# Patient Record
Sex: MALE | Race: WHITE
[De-identification: names, ages, dates, MRNs, and addresses within clinical notes are randomized per-mention and may not be internally consistent; named-entity substitution may affect disease eponyms.]

---

## 2019-06-28 ENCOUNTER — HOSPITAL ENCOUNTER (INPATIENT)
Dept: HOSPITAL 1 - ED | Age: 64
LOS: 5 days | Discharge: NURSING, REHAB OR LONG-TERM CARE | DRG: 291 | End: 2019-07-03
Attending: INTERNAL MEDICINE | Admitting: INTERNAL MEDICINE
Payer: COMMERCIAL

## 2019-06-28 VITALS
BODY MASS INDEX: 36.64 KG/M2 | WEIGHT: 247.37 LBS | HEIGHT: 69 IN | WEIGHT: 247.37 LBS | HEIGHT: 69 IN | BODY MASS INDEX: 36.64 KG/M2

## 2019-06-28 VITALS — DIASTOLIC BLOOD PRESSURE: 66 MMHG | SYSTOLIC BLOOD PRESSURE: 128 MMHG

## 2019-06-28 DIAGNOSIS — Z66: ICD-10-CM

## 2019-06-28 DIAGNOSIS — Z95.5: ICD-10-CM

## 2019-06-28 DIAGNOSIS — E11.9: ICD-10-CM

## 2019-06-28 DIAGNOSIS — G89.29: ICD-10-CM

## 2019-06-28 DIAGNOSIS — Z79.01: ICD-10-CM

## 2019-06-28 DIAGNOSIS — I11.0: Primary | ICD-10-CM

## 2019-06-28 DIAGNOSIS — I25.10: ICD-10-CM

## 2019-06-28 DIAGNOSIS — F17.210: ICD-10-CM

## 2019-06-28 DIAGNOSIS — I25.2: ICD-10-CM

## 2019-06-28 DIAGNOSIS — E11.65: ICD-10-CM

## 2019-06-28 DIAGNOSIS — Z86.718: ICD-10-CM

## 2019-06-28 DIAGNOSIS — H40.9: ICD-10-CM

## 2019-06-28 DIAGNOSIS — Z79.84: ICD-10-CM

## 2019-06-28 DIAGNOSIS — J96.00: ICD-10-CM

## 2019-06-28 DIAGNOSIS — J44.1: ICD-10-CM

## 2019-06-28 DIAGNOSIS — E78.5: ICD-10-CM

## 2019-06-28 DIAGNOSIS — I50.43: ICD-10-CM

## 2019-06-28 DIAGNOSIS — Z86.711: ICD-10-CM

## 2019-06-28 DIAGNOSIS — E66.9: ICD-10-CM

## 2019-06-28 DIAGNOSIS — Z91.14: ICD-10-CM

## 2019-06-28 DIAGNOSIS — E44.0: ICD-10-CM

## 2019-06-28 LAB
ALBUMIN SERPL-MCNC: 2.9 G/DL (ref 3.4–5)
ALP SERPL-CCNC: 55 U/L (ref 46–116)
ALT SERPL-CCNC: 65 U/L (ref 16–63)
AMYLASE SERPL-CCNC: 96 U/L (ref 25–115)
AST SERPL-CCNC: 12 U/L (ref 15–37)
BASOPHILS NFR BLD: 0.4 % (ref 0–2)
BILIRUB SERPL-MCNC: 0.2 MG/DL (ref 0.2–1)
BUN SERPL-MCNC: 18 MG/DL (ref 7–18)
CALCIUM SERPL-MCNC: 8.2 MG/DL (ref 8.5–10.1)
CHLORIDE SERPL-SCNC: 104 MMOL/L (ref 98–107)
CHOLEST SERPL-MCNC: 147 MG/DL (ref ?–200)
CHOLEST/HDLC SERPL: 1.6 MG/DL
CO2 SERPL-SCNC: 39.5 MMOL/L (ref 21–32)
CREAT SERPL-MCNC: 0.8 MG/DL (ref 0.7–1.3)
ERYTHROCYTE [DISTWIDTH] IN BLOOD BY AUTOMATED COUNT: 16 % (ref 11.5–14.5)
GFR SERPLBLD BASED ON 1.73 SQ M-ARVRAT: > 60 ML/MIN
GLUCOSE SERPL-MCNC: 177 MG/DL (ref 74–106)
HDLC SERPL-MCNC: 91 MG/DL (ref 40–60)
LIPASE SERPL-CCNC: 155 IU/L (ref 73–393)
MAGNESIUM SERPL-MCNC: 2 MG/DL (ref 1.8–2.4)
MICROSCOPIC UR-IMP: NO
PHOSPHATE SERPL-MCNC: 2.5 MG/DL (ref 2.5–4.9)
PLATELET # BLD: 126 X10^3MCL (ref 130–400)
POTASSIUM SERPL-SCNC: 3.9 MMOL/L (ref 3.5–5.1)
PROT SERPL-MCNC: 5.5 G/DL (ref 6.4–8.2)
RBC # UR STRIP.AUTO: NEGATIVE /UL
SODIUM SERPL-SCNC: 145 MMOL/L (ref 136–145)
TRIGL SERPL-MCNC: 59 MG/DL (ref ?–150)
UA SPECIFIC GRAVITY: 1.01 (ref 1–1.03)

## 2019-06-28 PROCEDURE — G0378 HOSPITAL OBSERVATION PER HR: HCPCS

## 2019-06-28 NOTE — NUR
PER MEDIC PT WAS A FACILITY AND STS THAT HIS ANKLE EDEMA HAS BEEN INCREASING.
PT STS THAT HE WAS AT [place] THREE DAYS AGO  FOR EDEMA. PT STS THAT HE
FEELS HE IS NOT BEING TAKEN CARE OF AT PREVIOUS HOME AND HOSPITALS. PT STS THAT
HE HAS BEEN HAVING INCREASING PEDAL EDEMA. +CAP REFILL IN BILATERAL LOWER
EXTREMITES. PT STS THAT HE IS HAVING "ALL OVER BODY PAIN". PT STS THAT HIS MAIN
CONCERN IS THAT HIS ANKLES ARE SWELLING. PT HAS SOME LABORED BREATHING AND
PLACED ON NC 3L. PT STS THAT HE HAS SOME DIZZINESS BUT THAT IT IS "NORMAL" FOR
HIM. PT DENIES ANY TEMP. PT HAS HAD A NON PRODUCTIVE COUGH. PT STS HE IS ON
OXYGEN AT HOME APPROX 2-3L. PT HAS ECCHYMOSIS NOTED ON RIGTH AC. BANDAGE ON
LEFT ANTERIOR FOOT. DR. LAU AT BEDSIDE FOR MSE.

## 2019-06-28 NOTE — NUR
SEROUS FLUID NOTED ON BILATER ANKLES. PT STS THAT THE FLUID HAS BEEN COMING
FROM HIS SKIN FOR THREE DAYS. ABDOMINAL DISTENTION NOTED. NO ECCHYNOSIS NOTED
ON ABDOMEN.

## 2019-06-28 NOTE — NUR
RECEIVED PT FROM ED VIA SAROJ. ORIENTED PT TO ROOM AND SURROUNDINGS. IV
NOTED TO LAC PATENT AND INTACT. TELE 15 PLACED ON PT READING STA. INSTRUCTED
PT ON THE USE OF CALL LIGHT FOR ASSISATNCE. ENDORSED PT TO PRIMARY NURSE
KAI

## 2019-06-28 NOTE — NUR
PT RECEIVING BREATHING TREATMENT PER REQUEST. PT STS HE IS "ABLE TO GET AIR IN
HIS LUNGS NOW". UPON AUSCULTATION PT HAS CLEAR BILATERAL LUNG SOUNDS. WILL
CONTINUE TO MONITOR. VSS.

## 2019-06-28 NOTE — NUR
PT C/O PAIN, OFFERED PT NORCO PO, PT REFUSES STATES "IT MAKES ME SICK", WHEN
ASKED PT WHAT HE USUALLY TAKES FOR PAIN, PT RESPONDS "DILAUDID AND MORPHINE".
WILL NOTIFY DR. SALINAS AND CLARIFY IVF ORDERS.

## 2019-06-29 VITALS — SYSTOLIC BLOOD PRESSURE: 108 MMHG | DIASTOLIC BLOOD PRESSURE: 60 MMHG

## 2019-06-29 VITALS — SYSTOLIC BLOOD PRESSURE: 156 MMHG | DIASTOLIC BLOOD PRESSURE: 63 MMHG

## 2019-06-29 VITALS — SYSTOLIC BLOOD PRESSURE: 121 MMHG | DIASTOLIC BLOOD PRESSURE: 53 MMHG

## 2019-06-29 VITALS — DIASTOLIC BLOOD PRESSURE: 61 MMHG | SYSTOLIC BLOOD PRESSURE: 140 MMHG

## 2019-06-29 VITALS — SYSTOLIC BLOOD PRESSURE: 118 MMHG | DIASTOLIC BLOOD PRESSURE: 49 MMHG

## 2019-06-29 LAB
AMPHETAMINES UR QL SCN: (no result)
T3 SERPL-MCNC: 0.88 NG/ML
T3RU NFR SERPL: 40 % UPTAKE (ref 33–39)
T4 FREE SERPL-MCNC: 0.9 NG/DL (ref 0.76–1.46)
T4 SERPL-MCNC: 5.5 UG/DL (ref 4.7–13.3)
T4/T3 UPTAKE INDEX SERPL: 2.2 UG/DL (ref 1.4–4.5)

## 2019-06-29 NOTE — NUR
PT MEDICATED PER EMAR WITH MORPHINE IVP ONCE. PT ASKED WHEN NEXT DOSE OF
MORPHINE WILL BE READY, TOLD PT THAT ORDER WAS FOR ONE TIME DOSE AND
ENCOURAGED PT TO TRY DILAUDID PO FOR PAIN, PT STILL REFUSES.

## 2019-06-29 NOTE — NUR
PT BECOMING AGITATED, STATES "I'M IN A LOT OF PAIN AND I NEED IT NOW". DR. MITCHELL CALLED AND MADE AWARE.

## 2019-06-29 NOTE — NUR
RECEIVED PT FROM PREVIOUS SHIFT. CURRENTLY SITTING UP AT EDGE OF BED. NO C/O
PAIN AT THIS TIME.  BREATHING E/U ON 3L OXYGEN NC. AAO. ABLE TO MAKE NEEDS
KNOWN. TELE #15 SHOWING NSR WITH ELEVATED T-WAVE, DENIES CP. NO S/S ACUTE
DISTRESS. BLE +4 EDEMA NOTED. CALL LIGHT WITHIN REACH. SAFETY MEASURES IN
PLACE. WILL CONTINUE TO MONITOR.

## 2019-06-29 NOTE — NUR
PT REQUESTING "ANOTHER PAIN SHOT". DR. SALINAS MADE AWARE. PT ALSO ON REGULAR
DIET PER REQUEST, CLARIFIED ORDER WITH DR. SALINAS WHO SAID IT WAS OKAY.

## 2019-06-29 NOTE — NUR
RECEIVED PT FROM NIGHT SHIFT NURSE. PT SITTING AT EDGE OF BED, AOX4, RESP EVEN
AND LABORED ON NC AT 3 L/MIN. C/O OF CHEST PAIN ON INSPIRATION AND SOB, WILL
CARRY OUT MED ORDERS FOR PAIN. ON TELE 15 SHOWING NSR, HR: 87.  SALINE LOCK TO
LAC W/ NO ERYTHEMA OR EDEMA. BED IN LOWEST POSITION AND CALL LIGHT WITHIN
REACH. WILL CONTINUE TO MONITOR.

## 2019-06-29 NOTE — NUR
PT REFUSED DILAUDID PO, STATES "IT MAKES ME SICK IT HAS TO BE CRUSHED AND
PUSHED", TOLD PT IT WAS MEANT FOR PO AND NOT IVP. DR. SALINAS MADE AWARE.
WAITING FOR NEW ORDERS.

## 2019-06-30 VITALS — SYSTOLIC BLOOD PRESSURE: 108 MMHG | DIASTOLIC BLOOD PRESSURE: 46 MMHG

## 2019-06-30 VITALS — SYSTOLIC BLOOD PRESSURE: 126 MMHG | DIASTOLIC BLOOD PRESSURE: 45 MMHG

## 2019-06-30 VITALS — DIASTOLIC BLOOD PRESSURE: 42 MMHG | SYSTOLIC BLOOD PRESSURE: 114 MMHG

## 2019-06-30 VITALS — DIASTOLIC BLOOD PRESSURE: 46 MMHG | SYSTOLIC BLOOD PRESSURE: 128 MMHG

## 2019-06-30 VITALS — DIASTOLIC BLOOD PRESSURE: 51 MMHG | SYSTOLIC BLOOD PRESSURE: 135 MMHG

## 2019-06-30 NOTE — NUR
PT RESTING IN BED, AOX4, RESP E/U ON NC AT 2 LPM. REPORTED MIDLINE CHEST PAIN
RATED 9/10, DENIES HEADACHE OR PALPITATIONS. MEDICATED AS ORDERED. SWELLING
AND ECCHYMOSIS TO VENIPUNCTURE SITE AT [facility] NOTED, ELEVATED EXTREMITY AND
APPLIED ICE PACK. BED IN LOWEST POSITION AND CALL LIGHT WITHIN REACH. WILL
CONTINUE TO MONITOR.

## 2019-06-30 NOTE — NUR
RECEIVED PT FROM NIGHT SHIFT NURSE. PT SITTING AT EDGE OF BED, AOX4, RESP EVEN
AND LABORED ON NC AT 3 LPM. C/O OF GENERALIZED BODY PAIN BUT TOLERABLE AT THIS
TIME. REMINDED PT ON FLUID RESTRICTIONS FOR STRICT I & O MONITORING, COMPLIANT
W/ INSTRUCTIONS. ON TELE 15 SHOWING SR W/ ELEVATED T-WAVE, HR: 77. SALINE LOCK
TO LAC W/ NO ERYTHEMA OR EDEMA. BED IN LOWEST POSITION AND CALL LIGHT WITHIN
REACH. WILL CONTINUE TO MONITOR.

## 2019-06-30 NOTE — NUR
PT SITTING AT EDGE OF BED HAVING DINNER, AOX4, RESP E/U ON NC AT 3 LPM.
REPORTED MILD RELIEF OF PAIN TO LEGS RATED 6/10, PAIN TRIGGERED WHEN
TOUCH/PRESSURE APPLIED TO AREA, OTHERWISE TOLERABLE. IV SALINE LOCK W/ NO
ERYTHEMA OR EDEMA. BED IN LOWEST POSITION AND CALL LIGHT WITHIN REACH. WILL
ENDORSE TO ONCOMING NURSE.

## 2019-06-30 NOTE — NUR
PT SITTING AT EDGE OF BED, AOX4, RESP E/U ON NC AT 3 LPM. MEDICATED AS ORDERED
FOR BLE PAIN RATED 8/10. 660 ML OF URINE DRAINED FROM URINAL AT THIS TIME.
STRICT I & O MAINTAINED. BED IN LOWEST POSITION AND CALL LIGHT WITHIN REACH.
WILL CONTINUE TO MONITOR.

## 2019-06-30 NOTE — NUR
PT MEDICATED PER EMAR FOR 10/10 BLE PAIN. NO S/S ACUTE DISTRESS. CALL LIGHT
WITHIN REACH. WILL CONTINUE TO MONITOR.

## 2019-06-30 NOTE — NUR
RECEIVED PT FROM PREVIOUS SHIFT. PT A/OX4. DENIES PAIN. DENIES SOB ON RA. IV
PATENT AND FLUSHING WELL. CALL LIGHT WITHIN REACH, BED IN LOW POSITION. WILL
CONTINUE TO MONITOR.

## 2019-07-01 VITALS — SYSTOLIC BLOOD PRESSURE: 118 MMHG | DIASTOLIC BLOOD PRESSURE: 72 MMHG

## 2019-07-01 VITALS — DIASTOLIC BLOOD PRESSURE: 64 MMHG | SYSTOLIC BLOOD PRESSURE: 140 MMHG

## 2019-07-01 VITALS — DIASTOLIC BLOOD PRESSURE: 81 MMHG | SYSTOLIC BLOOD PRESSURE: 120 MMHG

## 2019-07-01 VITALS — SYSTOLIC BLOOD PRESSURE: 149 MMHG | DIASTOLIC BLOOD PRESSURE: 60 MMHG

## 2019-07-01 LAB
BASOPHILS NFR BLD: 0 % (ref 0–2)
BUN SERPL-MCNC: 29 MG/DL (ref 7–18)
CALCIUM SERPL-MCNC: 9.4 MG/DL (ref 8.5–10.1)
CHLORIDE SERPL-SCNC: 98 MMOL/L (ref 98–107)
CO2 SERPL-SCNC: 41.1 MMOL/L (ref 21–32)
CREAT SERPL-MCNC: 0.8 MG/DL (ref 0.7–1.3)
ERYTHROCYTE [DISTWIDTH] IN BLOOD BY AUTOMATED COUNT: 15.9 % (ref 11.5–14.5)
GFR SERPLBLD BASED ON 1.73 SQ M-ARVRAT: > 60 ML/MIN
GLUCOSE SERPL-MCNC: 167 MG/DL (ref 74–106)
PLATELET # BLD: 170 X10^3MCL (ref 130–400)
POTASSIUM SERPL-SCNC: 4.3 MMOL/L (ref 3.5–5.1)
SODIUM SERPL-SCNC: 144 MMOL/L (ref 136–145)

## 2019-07-01 NOTE — NUR
A/A/O X4. DENIES DIZZINESS AND HEADACHE. FINE CRACKLES NOTED SENAIT LUNGS.
BREATHING EVEN AND LABORED ON 2L NC. DENIES CHEST PAIN AND PRESSURE. BOWEL
SOUNDS ACTIVE. NO C/O N/V AND ABD PAIN. WEEPING NOTED ON TOP OF THE LEFT FOOT
BEN. PITTING EDEMA NOTED ON BLE. IV INTACT ON THE LAC. MADE PT COMFORTABLE.
PLACED CALL LIGHT WITH IN REACH. WILL CONTINUE TO MONITOR.

## 2019-07-01 NOTE — NUR
AAO TIMES 4. PLEASANT, COOPERATIVE. MED SURG PATIENT. NO C/O PAIN AT THIS
TIME. VS'S STABLE. NO SOB. IV SITE LAC PATENT, CDI.

## 2019-07-01 NOTE — NUR
Initial Nutrition Assessment: 255T/B STACEY RODRIGUES IA HR
 
Dx: COPD Exacerbation
PMHx: COPD, CHF, CAD with multiple MIs s/p stent placement x2, HTN, DM, HLD,
glaucoma, chronic pain, past AAA and DVT with PE
PSHx: CABG
Labs: BG 167H, A1C 6.9H
Meds: Colace, D10%, humulin, vitamin B12, Zofran
Diet: Regular
PO Intake: (6/30) 100%
Ht: 175.26 cm (69")  Wt: 112 kg (246 #) BMI: 36.5 kg/m2 (obesity) Bed scale:
112 kg
IBW: 160# (78 kg) %IBW: 153  UBW: 112 kg
Age: 64/M
Food Allergies: NKFA
Skin: LLE weeping ulcer, BEN   Jl: 17
Edema: +3 BLE
GI: Last BM: 6/28
 
Per H&P, Pt is a 64yoM with PMH COPD, CHF, CAD with multiple MIs s/p stent
placment x2, HTN, DM, HLD, glaucoma, chronic pain, past AAA and DVT with PE
who presented to the ED from [facility] c/o worsening SOB with bilateral
LE pitting edema x1 week.  Pt states recent hospitalization 3 days prior at
[place] for similar complaints and claims he was sent home "too early,"
expressing frustration with his treatment at both [place] and the [facility].
 
RDN Visit (7/1): FNS received consult for 'moderate malnutrition w/obesity' on
6/30. Patient was alert and oriented and said that he is going to die anyways
and so he does not want to follow any sort of diet restriction. He only wants
to do two things eat and sleep. He also said that the food provided to him is
not enough for him. Patient was absolutely not ready to listen to any diet
education or the need to change his current dietary habits. Patient is also
not willing to consume any nutritional supplements. Patient  refused to accept
that his A1C is high and he is diabetic. Spoke with NP Queta about patient's
reluctance to change diet order. NP said to document that patient refused to
change diet order.
 
Problem with:  N/V/D/C: no
Problems with: Chewing/Swallowing: no
Current appetite: good
Recent wt change: patient said that he is on Lasix and so has lost fluid
weight  %wt change: N/A
Vitamin/Supplement use: none
Special diet at home: regular
Physical activity: none
Nutrition education given: Patient declined
Food-drug interactions: Colace- high fiber w/4203-0845 ml fluids   Education
given: no
 
Estimated Nutritional Needs Based on adjusted body weight 87 kg
 Energy: 2297-1906 kcal/d (25-30 kcal/kg) maintenance
 Protein:  g/d (1.0-1.2 g/kg)- preserve LBM
 Fluid:  1124-4614 ml/d (1 ml/kcal) or per doctor
 
Nutrition Diagnosis
1. Not ready for diet/lifestyle change related to unwilling to learn
information as evidenced by denial of need for food and nutrition related
changes.
 
Intervention
1. Recommend CCHO, low fat diet. (Patient refusing this change)
 
Monitor/Evaluate
 Goal: PO intake at least 75% of estimated needs
 Monitor: PO intake, Labs, GI function
 F/U in 3-5 days as moderate risk 7/4-6

## 2019-07-01 NOTE — NUR
AAO TIMES 4. PLEASANT, COOPERATIVE. TELE # 15 SR. LUNGS DIMINISHED LEFT SIDE.
O2 SAT ON %. BS'S ACTIVE TIMES 4. OBESE. +3 EDEMA BLE. PERIPHERAL PULSES
PALPABLE. C/O GENERALIZED BODY PAIN FROM NECK DOWN, IS VERY TIME ORIENTED ON
GETTING HIS DILAUDID IVP ON TIME EVERY 4 HOURS, HE SAYS HE TAKES DILAUDID PO
AT HOME. HE STATES THE NP WAS GOING TO INCREASE HIS DILAUDID TO 2 MG IVP, I
TALKED TO ELI AND SHE IS GOING TO CHECK WITH THE PHYSICIAN BEFORE SHE
INCREASED HIS MEDICATIONS, BECAUSE HE WILL BE GOING TO AN LTAC AND THE MEDS
HAVE TO BE TRENDING DOWN IN STRENGTH BEFORE DISCHARGE.

## 2019-07-01 NOTE — NUR
PT RESTING IN NO ACUTE DISTRESS. RR EVEN AND UNLABORED. CALL LIGHT WITHIN
 REACH, BED IN LOW POSITION. WILL CONTINUE TO MONITOR.

## 2019-07-02 VITALS — DIASTOLIC BLOOD PRESSURE: 79 MMHG | SYSTOLIC BLOOD PRESSURE: 143 MMHG

## 2019-07-02 VITALS — SYSTOLIC BLOOD PRESSURE: 136 MMHG | DIASTOLIC BLOOD PRESSURE: 57 MMHG

## 2019-07-02 VITALS — DIASTOLIC BLOOD PRESSURE: 51 MMHG | SYSTOLIC BLOOD PRESSURE: 108 MMHG

## 2019-07-02 VITALS — DIASTOLIC BLOOD PRESSURE: 47 MMHG | SYSTOLIC BLOOD PRESSURE: 113 MMHG

## 2019-07-02 LAB
BASOPHILS NFR BLD: 0 % (ref 0–2)
BUN SERPL-MCNC: 26 MG/DL (ref 7–18)
CALCIUM SERPL-MCNC: 8.9 MG/DL (ref 8.5–10.1)
CHLORIDE SERPL-SCNC: 98 MMOL/L (ref 98–107)
CO2 SERPL-SCNC: 38.9 MMOL/L (ref 21–32)
CREAT SERPL-MCNC: 0.9 MG/DL (ref 0.7–1.3)
ERYTHROCYTE [DISTWIDTH] IN BLOOD BY AUTOMATED COUNT: 16.1 % (ref 11.5–14.5)
GFR SERPLBLD BASED ON 1.73 SQ M-ARVRAT: > 60 ML/MIN
GLUCOSE SERPL-MCNC: 201 MG/DL (ref 74–106)
PLATELET # BLD: 166 X10^3MCL (ref 130–400)
POTASSIUM SERPL-SCNC: 3.8 MMOL/L (ref 3.5–5.1)
SODIUM SERPL-SCNC: 139 MMOL/L (ref 136–145)

## 2019-07-02 NOTE — NUR
AAO TIMES 4. NO TELE. PLEASANT, COOPERATIVE. NO C/O PAIN. AMBULATORY. +3 EDEMA
BLE. I REMINDED HIM TO MAINTAIN HIS FLUID RESTRICTION AND HE SAYS HE HAS. IV
SITE LAC PATENT, CDI. HE SITS UP SOMETIMES AT THE SIDE OF BED, AND ALSO LAYS
IN BED WITH HIS FEET ELEVATED. CINTHYA GUEVARA CAME IN THIS AFTERNOON TO SEE IF
HE QUALIFIES FOR THEIR FACILITY.

## 2019-07-02 NOTE — NUR
REC'D PT FROM DAY NURSE. PT RESTING IN BED. SLEEPING. AWAKENS WITH VERBAL
STIMULI. DROWSY. AAOX4, SPEECH CLEAR, FOLLOWS COMMANDS. MED SURG, NO TELE.
DNEIES CP, DIZZINESS, OR PALPITATIONS. DENIES RESP DISTRESS OR SOB. BREATHING
EVEN/UNLABORED ON 3L O2 VIA NC, SPO2 98%. PT STATES HE IS ON HOME O2- 3L.
PITTING EDEMA TO BLE WEEPING MINIMAL SEROUS FLUID. BLE ELEVATED WITH PILLOWS.
ABD SOFT/DISTENDED. REPORTS TENDERNESS WITH PALPATION. DENIES PAIN AT REST.
VOIDING FREELY USING URINAL. GEN WEAKNESS. WC AT HOME. IV TO LAC FLUSHED AND
PATENT, SITE WNL. CALL LIGHT WITHIN REACH, BED AT LOWEST POSITION. WILL
CONTINUE TO MONITOR.

## 2019-07-02 NOTE — NUR
PHYSICAL THERAPY NOTE
ATTEMPTED FOR FOLLOW UP PHYSICAL THERAPY SESSION. PATIENT REFUSED. PATIENT
EDUCATED ABOUT THE IMPORTANCE OF FUNCTIONAL MOB TRAINING.

## 2019-07-02 NOTE — NUR
AAO TIMES 4. NO TELE, MED SURG PATIENT.
BS'S ACTIVE TIMES 4. MAI STRONG. OBESE. BRP SELF, SOB WITH TOO MUCH ACTIVITY.
LUNGS CTA BUL, DIMINISHED BASES. JO2 SAT 96% ON RA. STATES HE HAD A BM
YESTERDAY, REFUSED THE LACTULOSE PO. +3 EDEMA BLE. PERIPHERAL PULSES PALPABLE.
ON ELIQUIS PO. COOPERATIVE AND PLEASANT. C/O GENERALIZED PAIN, HE IS ON
DILAUDID PO AT HOME. ASKED FOR DILAUDID IVP ONLY, FOR C/O GENERALIZED PAIN
7/10 AND GIVEN AT 0932, AT 1002 HE STATES RELIEF OF PAIN 2/10.

## 2019-07-03 VITALS — DIASTOLIC BLOOD PRESSURE: 58 MMHG | SYSTOLIC BLOOD PRESSURE: 136 MMHG

## 2019-07-03 VITALS — DIASTOLIC BLOOD PRESSURE: 65 MMHG | SYSTOLIC BLOOD PRESSURE: 124 MMHG

## 2019-07-03 LAB
BASOPHILS NFR BLD: 0 % (ref 0–2)
BUN SERPL-MCNC: 31 MG/DL (ref 7–18)
CALCIUM SERPL-MCNC: 9.3 MG/DL (ref 8.5–10.1)
CHLORIDE SERPL-SCNC: 99 MMOL/L (ref 98–107)
CO2 SERPL-SCNC: 42.4 MMOL/L (ref 21–32)
CREAT SERPL-MCNC: 0.9 MG/DL (ref 0.7–1.3)
ERYTHROCYTE [DISTWIDTH] IN BLOOD BY AUTOMATED COUNT: 15.8 % (ref 11.5–14.5)
GFR SERPLBLD BASED ON 1.73 SQ M-ARVRAT: > 60 ML/MIN
GLUCOSE SERPL-MCNC: 187 MG/DL (ref 74–106)
PLATELET # BLD: 154 X10^3MCL (ref 130–400)
POTASSIUM SERPL-SCNC: 3.9 MMOL/L (ref 3.5–5.1)
SODIUM SERPL-SCNC: 142 MMOL/L (ref 136–145)

## 2019-07-03 NOTE — NUR
PT RESTING IN BED WITH EYES CLOSED. BREATHING EVEN/UNLABORED ON 3L NC. DENIES
SOB. DENIES PAIN AT THIS TIME. . INSULIN GIVEN WITH SNACK PER REQUEST.
BLE STILL REMAINS EDEMATOUS WEEPING SMALL AMT OF SEROUS FLUID. NO SIGNIFICANT
CHANGES DURING SHIFT. CALL LIGHT WITHIN REACH, BED AT LOWEST POSITION. WILL
ENDORSE TO DAY NURSE.

## 2019-07-03 NOTE — NUR
S TRANSPORT IS HERE TO  PT. PT IS BREATHING EVEN AND UNLABORED ON 3L
NC, NO ACUTE RESP DISTRESS OR SOB NOTED. IV TO LAC INTACT AND PATENT/
HEPLOCKED. PT DENIES ANY CP OR PRESSURE/ MEDICATED PER EMAR FOR BLE
DISCOMFORT. PT BRING TRANSFERED TO [place] TO CONTINUE CARE.

## 2019-07-03 NOTE — NUR
EXPLAINED TRANSFER TO PT, PT AGREED AND SIGNED ALL DOCUMENTS. WAITING ON PICK
UP FROM BLS. WILL CONTINUE TO MONITOR.

## 2019-07-03 NOTE — NUR
PT ENDORSE TO ME THIS MORNING. RESTING IN BED, AA/O X4, BREATHING EVEN AND
UNLABORED ON 3 LNC, NO ACUTE RESP DISTRESS OR SOB NOTED. SPEECH CLEAR AND
FOLLOWING ALL COMMANDS. MEDSURG, DENIES ANY CP OR PRESSURE OR DIZINESS AT THIS
TIME. VOIDS FREELY. AMB WITH GEN WEAKNESS/ KNOWS TO CALL FOR ASSIST. BOWEL
SOUNDS ACTIVE IN ALL FOUR QUADS, PER PT PASSING GAS. PITTING EDEMA NOTED BLE
WEEPING MINIMAL SEROUS FLUID/ BLE EVEVATED WITH PILLOWS. IV TO THE LAC INTACT
AND PATENT, NO REDNESS OR SWELLING NOTED.CALL LIGHT IN REACH. BED IN LOW
POSTION. WILL CONTINUE TO MONITOR.

## 2019-07-03 NOTE — NUR
CALLED [facility] TO GIVE REPORT,  STATED THE PHONES ARE DOWN
AND NEED TO CALL BACK AFTER 1500.

## 2019-07-03 NOTE — NUR
PT RESTING IN BED WITH EYES CLOSED. NO SIGNS OF DISTRESS NOTED. BREATHING
EVEN/UNLABORED ON 3L O2 VIA NC. BLE ELEVATED WITH PILLOWS. CALL LIGHT WITHIN
REACH, BED AT LOWEST POSITION. WILL CONTINUE TO MONITOR.